# Patient Record
Sex: FEMALE | Race: WHITE | URBAN - METROPOLITAN AREA
[De-identification: names, ages, dates, MRNs, and addresses within clinical notes are randomized per-mention and may not be internally consistent; named-entity substitution may affect disease eponyms.]

---

## 2024-11-06 ENCOUNTER — OFFICE VISIT (OUTPATIENT)
Dept: FAMILY MEDICINE CLINIC | Facility: CLINIC | Age: 29
End: 2024-11-06
Payer: OTHER GOVERNMENT

## 2024-11-06 VITALS
SYSTOLIC BLOOD PRESSURE: 102 MMHG | DIASTOLIC BLOOD PRESSURE: 68 MMHG | RESPIRATION RATE: 16 BRPM | OXYGEN SATURATION: 95 % | HEIGHT: 65 IN | BODY MASS INDEX: 26.82 KG/M2 | WEIGHT: 161 LBS | TEMPERATURE: 97.6 F | HEART RATE: 74 BPM

## 2024-11-06 DIAGNOSIS — Z76.89 ESTABLISHING CARE WITH NEW DOCTOR, ENCOUNTER FOR: Primary | ICD-10-CM

## 2024-11-06 DIAGNOSIS — Z00.00 ANNUAL PHYSICAL EXAM: ICD-10-CM

## 2024-11-06 PROCEDURE — 99385 PREV VISIT NEW AGE 18-39: CPT | Performed by: STUDENT IN AN ORGANIZED HEALTH CARE EDUCATION/TRAINING PROGRAM

## 2024-11-06 PROCEDURE — 90471 IMMUNIZATION ADMIN: CPT | Performed by: STUDENT IN AN ORGANIZED HEALTH CARE EDUCATION/TRAINING PROGRAM

## 2024-11-06 PROCEDURE — 90661 CCIIV3 VAC ABX FR 0.5 ML IM: CPT | Performed by: STUDENT IN AN ORGANIZED HEALTH CARE EDUCATION/TRAINING PROGRAM

## 2024-11-06 RX ORDER — FAMOTIDINE 20 MG/1
20 TABLET, FILM COATED ORAL 2 TIMES DAILY
COMMUNITY

## 2024-11-06 SDOH — ECONOMIC STABILITY: FOOD INSECURITY: WITHIN THE PAST 12 MONTHS, THE FOOD YOU BOUGHT JUST DIDN'T LAST AND YOU DIDN'T HAVE MONEY TO GET MORE.: NEVER TRUE

## 2024-11-06 SDOH — ECONOMIC STABILITY: FOOD INSECURITY: WITHIN THE PAST 12 MONTHS, YOU WORRIED THAT YOUR FOOD WOULD RUN OUT BEFORE YOU GOT MONEY TO BUY MORE.: NEVER TRUE

## 2024-11-06 SDOH — ECONOMIC STABILITY: INCOME INSECURITY: HOW HARD IS IT FOR YOU TO PAY FOR THE VERY BASICS LIKE FOOD, HOUSING, MEDICAL CARE, AND HEATING?: NOT HARD AT ALL

## 2024-11-06 ASSESSMENT — PATIENT HEALTH QUESTIONNAIRE - PHQ9
SUM OF ALL RESPONSES TO PHQ9 QUESTIONS 1 & 2: 0
1. LITTLE INTEREST OR PLEASURE IN DOING THINGS: NOT AT ALL
SUM OF ALL RESPONSES TO PHQ QUESTIONS 1-9: 0
DEPRESSION UNABLE TO ASSESS: FUNCTIONAL CAPACITY MOTIVATION LIMITS ACCURACY
SUM OF ALL RESPONSES TO PHQ QUESTIONS 1-9: 0
2. FEELING DOWN, DEPRESSED OR HOPELESS: NOT AT ALL

## 2024-11-06 ASSESSMENT — ENCOUNTER SYMPTOMS
SINUS PAIN: 0
CHEST TIGHTNESS: 0
SHORTNESS OF BREATH: 0

## 2024-11-06 NOTE — PROGRESS NOTES
Beronica Man is a 29 y.o. female (: 1995) presenting to address:    Chief Complaint   Patient presents with    New Patient     Flu Vaccine       Vitals:    24 1332   BP: 102/68   Pulse: 74   Resp: 16   Temp: 97.6 °F (36.4 °C)   SpO2: 95%       \"Have you been to the ER, urgent care clinic since your last visit?  Hospitalized since your last visit?\"    NO    “Have you seen or consulted any other health care providers outside of [facility] since your last visit?”    Yes, Valeri THACKER        “Have you had a pap smear?”    YES - Where: 2022 in [place] Nurse/CMA to request most recent records if not in the chart    No cervical cancer screening on file         
Immunizations Administered       Name Date Dose Route    Influenza, FLUCELVAX, (age 6 mo+) IM, Trivalent PF, 0.5mL 11/6/2024 0.5 mL Intramuscular    Site: Deltoid- Left    Lot: [id number]    NDC: 11625-336-75           
for agitation and behavioral problems.        Physical Exam  Constitutional:       Appearance: Normal appearance.   HENT:      Head: Normocephalic and atraumatic.   Eyes:      Extraocular Movements: Extraocular movements intact.      Pupils: Pupils are equal, round, and reactive to light.   Cardiovascular:      Rate and Rhythm: Normal rate and regular rhythm.      Heart sounds: Normal heart sounds. No murmur heard.  Pulmonary:      Effort: Pulmonary effort is normal. No respiratory distress.      Breath sounds: No wheezing.   Musculoskeletal:         General: No swelling.   Skin:     General: Skin is warm and dry.   Neurological:      General: No focal deficit present.      Mental Status: She is alert and oriented to person, place, and time.   Psychiatric:         Mood and Affect: Mood normal.         Behavior: Behavior normal.          ASSESSMENT and PLAN    Beronica was seen today for new patient.    Diagnoses and all orders for this visit:    Establishing care with new doctor, encounter for  Comments:  flu vaccine, labs UTD, fu in 1 year for physical    Annual physical exam    Other orders  -     Influenza, FLUCELVAX Trivalent, (age 6 mo+) IM, Preservative Free, 0.5mL                 Tanya Jaimes D.O.      PLEASE NOTE:   This document has been produced using voice recognition software. Unrecognized errors in transcription may be present.

## 2024-11-21 RX ORDER — AMOXICILLIN 875 MG/1
875 TABLET, COATED ORAL EVERY 12 HOURS
COMMUNITY
Start: 2024-11-18 | End: 2024-11-28

## 2024-11-22 ENCOUNTER — OFFICE VISIT (OUTPATIENT)
Dept: FAMILY MEDICINE CLINIC | Facility: CLINIC | Age: 29
End: 2024-11-22

## 2024-11-22 VITALS
TEMPERATURE: 97.8 F | OXYGEN SATURATION: 97 % | HEART RATE: 84 BPM | RESPIRATION RATE: 14 BRPM | BODY MASS INDEX: 27.49 KG/M2 | DIASTOLIC BLOOD PRESSURE: 71 MMHG | SYSTOLIC BLOOD PRESSURE: 109 MMHG | HEIGHT: 64 IN | WEIGHT: 161 LBS

## 2024-11-22 DIAGNOSIS — J06.9 VIRAL URI: Primary | ICD-10-CM

## 2024-11-22 LAB
EXP DATE SOLUTION: NORMAL
EXP DATE SWAB: NORMAL
EXPIRATION DATE: NORMAL
INFLUENZA A ANTIGEN, POC: NEGATIVE
INFLUENZA B ANTIGEN, POC: NEGATIVE
LOT NUMBER POC: NORMAL
LOT NUMBER SOLUTION: NORMAL
LOT NUMBER SWAB: NORMAL
SARS-COV-2 RNA, POC: NEGATIVE
VALID INTERNAL CONTROL, POC: YES

## 2024-11-22 RX ORDER — CETIRIZINE HYDROCHLORIDE 10 MG/1
10 TABLET ORAL DAILY
Qty: 30 TABLET | Refills: 0 | Status: SHIPPED | OUTPATIENT
Start: 2024-11-22

## 2024-11-22 RX ORDER — FLUTICASONE PROPIONATE 50 MCG
1 SPRAY, SUSPENSION (ML) NASAL DAILY PRN
COMMUNITY
End: 2024-11-22 | Stop reason: SDUPTHER

## 2024-11-22 RX ORDER — ACETAMINOPHEN 325 MG/1
650 TABLET ORAL EVERY 6 HOURS PRN
COMMUNITY

## 2024-11-22 RX ORDER — FLUTICASONE PROPIONATE 50 MCG
1 SPRAY, SUSPENSION (ML) NASAL DAILY PRN
Qty: 16 G | Refills: 1 | Status: SHIPPED | OUTPATIENT
Start: 2024-11-22

## 2024-11-22 ASSESSMENT — ENCOUNTER SYMPTOMS
CHEST TIGHTNESS: 0
SINUS PAIN: 1
COUGH: 1
SHORTNESS OF BREATH: 0

## 2024-11-22 NOTE — PROGRESS NOTES
Beronica Man is a 29 y.o. female (: 1995) presenting to address:    Chief Complaint   Patient presents with    Follow-up     Sinus infection DX on Monday with [facility]; was given Amoxicillin but not fully taking symptoms away    Nasal Congestion; Headaches; Ears/Cheek Pain; Wet Cough       Vitals:    24 0845   BP: 109/71   Pulse: 84   Resp: 14   Temp: 97.8 °F (36.6 °C)   SpO2: 97%       \"Have you been to the ER, urgent care clinic since your last visit?  Hospitalized since your last visit?\"    Yes, Urgent Care for Sinus infection     “Have you seen or consulted any other health care providers outside of [facility] since your last visit?”    Yes, OBGYN        “Have you had a pap smear?”    Not Due til     No cervical cancer screening on file

## 2024-11-22 NOTE — PROGRESS NOTES
[facility]    HISTORY OF PRESENT ILLNESS  Beronica Man  is a 29 y.o. y.o. female here for acute visit    Sinusitis 1.5 weeks  -went to urgent care at start of symptoms, prescribed amoxicillin  -abx have not helped, feeling worse, but still on amoxicillin  -endorses pain in ears and radiates to jaws  -productive cough  -taking tylenol, mucinex, afrin, some relief with mucinex  -toddler son is sick too  -has been off of work because not feeling well  -pregnant at 18 weeks      Mr#: [id number]      History reviewed. No pertinent past medical history.    History reviewed. No pertinent surgical history.    History reviewed. No pertinent family history.    No Known Allergies    Social History     Tobacco Use   Smoking Status Never    Passive exposure: Never   Smokeless Tobacco Never       Social History     Substance and Sexual Activity   Alcohol Use Not Currently       Immunization History   Administered Date(s) Administered    Influenza, FLUCELVAX, (age 6 mo+) IM, Trivalent PF, 0.5mL 11/06/2024       There is no problem list on file for this patient.        Current Outpatient Medications:     dextromethorphan-guaiFENesin (MUCINEX DM)  MG per extended release tablet, Take 1 tablet by mouth every 12 hours as needed, Disp: , Rfl:     acetaminophen (TYLENOL) 325 MG tablet, Take 2 tablets by mouth every 6 hours as needed for Pain, Disp: , Rfl:     cetirizine (ZYRTEC) 10 MG tablet, Take 1 tablet by mouth daily, Disp: 30 tablet, Rfl: 0    fluticasone (FLONASE) 50 MCG/ACT nasal spray, 1 spray by Each Nostril route daily as needed for Rhinitis, Disp: 16 g, Rfl: 1    amoxicillin (AMOXIL) 875 MG tablet, Take 1 tablet by mouth in the morning and 1 tablet in the evening., Disp: , Rfl:     famotidine (PEPCID) 20 MG tablet, Take 1 tablet by mouth 2 times daily, Disp: , Rfl:       Review of Systems   Constitutional:  Negative for activity change and fever.   HENT:  Positive for congestion, ear pain and

## 2025-04-30 ENCOUNTER — RESULTS FOLLOW-UP (OUTPATIENT)
Dept: FAMILY MEDICINE CLINIC | Facility: CLINIC | Age: 30
End: 2025-04-30

## 2025-04-30 ENCOUNTER — HOSPITAL ENCOUNTER (OUTPATIENT)
Facility: HOSPITAL | Age: 30
Setting detail: SPECIMEN
Discharge: HOME | End: 2025-05-03
Payer: OTHER GOVERNMENT

## 2025-04-30 ENCOUNTER — OFFICE VISIT (OUTPATIENT)
Dept: FAMILY MEDICINE CLINIC | Facility: CLINIC | Age: 30
End: 2025-04-30
Payer: OTHER GOVERNMENT

## 2025-04-30 VITALS
HEART RATE: 71 BPM | DIASTOLIC BLOOD PRESSURE: 78 MMHG | HEIGHT: 64 IN | RESPIRATION RATE: 15 BRPM | WEIGHT: 166.8 LBS | OXYGEN SATURATION: 96 % | TEMPERATURE: 97.9 F | BODY MASS INDEX: 28.48 KG/M2 | SYSTOLIC BLOOD PRESSURE: 112 MMHG

## 2025-04-30 DIAGNOSIS — R30.0 DYSURIA: ICD-10-CM

## 2025-04-30 DIAGNOSIS — K42.9 UMBILICAL HERNIA WITHOUT OBSTRUCTION AND WITHOUT GANGRENE: Primary | ICD-10-CM

## 2025-04-30 LAB
BILIRUBIN, URINE, POC: NEGATIVE
BLOOD URINE, POC: ABNORMAL
GLUCOSE URINE, POC: NEGATIVE
KETONES, URINE, POC: NEGATIVE
LEUKOCYTE ESTERASE, URINE, POC: ABNORMAL
NITRITE, URINE, POC: NEGATIVE
PH, URINE, POC: 7 (ref 4.6–8)
PROTEIN,URINE, POC: NEGATIVE
SPECIFIC GRAVITY, URINE, POC: 1.01 (ref 1–1.03)
URINALYSIS CLARITY, POC: CLEAR
URINALYSIS COLOR, POC: YELLOW
UROBILINOGEN, POC: ABNORMAL

## 2025-04-30 PROCEDURE — 87088 URINE BACTERIA CULTURE: CPT

## 2025-04-30 PROCEDURE — 81003 URINALYSIS AUTO W/O SCOPE: CPT | Performed by: STUDENT IN AN ORGANIZED HEALTH CARE EDUCATION/TRAINING PROGRAM

## 2025-04-30 PROCEDURE — 99214 OFFICE O/P EST MOD 30 MIN: CPT | Performed by: STUDENT IN AN ORGANIZED HEALTH CARE EDUCATION/TRAINING PROGRAM

## 2025-04-30 PROCEDURE — 87086 URINE CULTURE/COLONY COUNT: CPT

## 2025-04-30 RX ORDER — SULFAMETHOXAZOLE AND TRIMETHOPRIM 800; 160 MG/1; MG/1
1 TABLET ORAL 2 TIMES DAILY
Qty: 6 TABLET | Refills: 0 | Status: SHIPPED | OUTPATIENT
Start: 2025-04-30 | End: 2025-05-03

## 2025-04-30 RX ORDER — FERROUS SULFATE 325(65) MG
325 TABLET ORAL DAILY
COMMUNITY
Start: 2025-04-18

## 2025-04-30 SDOH — ECONOMIC STABILITY: FOOD INSECURITY: WITHIN THE PAST 12 MONTHS, YOU WORRIED THAT YOUR FOOD WOULD RUN OUT BEFORE YOU GOT MONEY TO BUY MORE.: NEVER TRUE

## 2025-04-30 SDOH — ECONOMIC STABILITY: FOOD INSECURITY: WITHIN THE PAST 12 MONTHS, THE FOOD YOU BOUGHT JUST DIDN'T LAST AND YOU DIDN'T HAVE MONEY TO GET MORE.: NEVER TRUE

## 2025-04-30 ASSESSMENT — PATIENT HEALTH QUESTIONNAIRE - PHQ9
1. LITTLE INTEREST OR PLEASURE IN DOING THINGS: NOT AT ALL
SUM OF ALL RESPONSES TO PHQ QUESTIONS 1-9: 0
2. FEELING DOWN, DEPRESSED OR HOPELESS: NOT AT ALL
SUM OF ALL RESPONSES TO PHQ QUESTIONS 1-9: 0

## 2025-04-30 ASSESSMENT — ENCOUNTER SYMPTOMS
CHEST TIGHTNESS: 0
SHORTNESS OF BREATH: 0
SINUS PAIN: 0

## 2025-04-30 NOTE — PROGRESS NOTES
Beronica Man is a 29 y.o. female (: 1995) presenting to address:    Chief Complaint   Patient presents with    umbilical hernia       Vitals:    25 1150   BP: 112/78   Pulse: 71   Resp: 15   Temp: 97.9 °F (36.6 °C)   SpO2: 96%       \"Have you been to the ER, urgent care clinic since your last visit?  Hospitalized since your last visit?\"    NO    “Have you seen or consulted any other health care providers outside of [facility] since your last visit?”    NO        “Have you had a pap smear?”    NO    No cervical cancer screening on file

## 2025-04-30 NOTE — PROGRESS NOTES
[facility]    HISTORY OF PRESENT ILLNESS  Beronica Man  is a 29 y.o. y.o. female here for FU.    Umbilical hernia  -started during pregnancy  -became painful during pregnancy  after pregnancy    Dysuria  -started 2 days ago  -would like to check urine  -breast feeding    Mr#: [id number]      History reviewed. No pertinent past medical history.    History reviewed. No pertinent surgical history.    History reviewed. No pertinent family history.    No Known Allergies    Social History     Tobacco Use   Smoking Status Never    Passive exposure: Never   Smokeless Tobacco Never       Social History     Substance and Sexual Activity   Alcohol Use Not Currently       Immunization History   Administered Date(s) Administered    COVID-19, MODERNA BLUE border, Primary or Immunocompromised, (age 12y+), IM, 100 mcg/0.5mL 01/27/2021, 02/26/2021, 12/08/2021    Influenza, FLUCELVAX, (age 6 mo+) IM, Trivalent PF, 0.5mL 11/06/2024       There is no problem list on file for this patient.        Current Outpatient Medications:     ferrous sulfate (IRON 325) 325 (65 Fe) MG tablet, Take 1 tablet by mouth daily, Disp: , Rfl:     Ferrous Sulfate (IRON PO), Take 1 tablet by mouth daily, Disp: , Rfl:     sulfamethoxazole-trimethoprim (BACTRIM DS;SEPTRA DS) 800-160 MG per tablet, Take 1 tablet by mouth 2 times daily for 3 days, Disp: 6 tablet, Rfl: 0    acetaminophen (TYLENOL) 325 MG tablet, Take 2 tablets by mouth every 6 hours as needed for Pain, Disp: , Rfl:     cetirizine (ZYRTEC) 10 MG tablet, Take 1 tablet by mouth daily (Patient not taking: Reported on 4/30/2025), Disp: 30 tablet, Rfl: 0    fluticasone (FLONASE) 50 MCG/ACT nasal spray, 1 spray by Each Nostril route daily as needed for Rhinitis (Patient not taking: Reported on 4/30/2025), Disp: 16 g, Rfl: 1      Review of Systems   Constitutional:  Negative for activity change.   HENT:  Negative for congestion and sinus pain.    Respiratory:  Negative for chest

## 2025-05-02 LAB
BACTERIA SPEC CULT: ABNORMAL
CC UR VC: ABNORMAL
SERVICE CMNT-IMP: ABNORMAL

## 2025-05-05 ENCOUNTER — RESULTS FOLLOW-UP (OUTPATIENT)
Dept: FAMILY MEDICINE CLINIC | Facility: CLINIC | Age: 30
End: 2025-05-05

## 2025-05-05 RX ORDER — PENICILLIN V POTASSIUM 250 MG/1
500 TABLET, FILM COATED ORAL 2 TIMES DAILY
Qty: 40 TABLET | Refills: 0 | Status: SHIPPED | OUTPATIENT
Start: 2025-05-05 | End: 2025-05-15

## 2025-05-05 NOTE — TELEPHONE ENCOUNTER
Patient called about medication sent to the pharmacy for a new antibiotic prescription. Informed patient of message from Dr Brush. Patient wanted to know if previous medication was safe for breast feeding. Patient informed that PCP confirmed medication was safe for breast feeding. Patient verbally understood.

## 2025-06-05 ENCOUNTER — OFFICE VISIT (OUTPATIENT)
Dept: FAMILY MEDICINE CLINIC | Facility: CLINIC | Age: 30
End: 2025-06-05
Payer: OTHER GOVERNMENT

## 2025-06-05 ENCOUNTER — RESULTS FOLLOW-UP (OUTPATIENT)
Dept: FAMILY MEDICINE CLINIC | Facility: CLINIC | Age: 30
End: 2025-06-05

## 2025-06-05 VITALS
HEART RATE: 86 BPM | DIASTOLIC BLOOD PRESSURE: 78 MMHG | HEIGHT: 64 IN | RESPIRATION RATE: 20 BRPM | SYSTOLIC BLOOD PRESSURE: 113 MMHG | TEMPERATURE: 96.9 F | WEIGHT: 172 LBS | OXYGEN SATURATION: 96 % | BODY MASS INDEX: 29.37 KG/M2

## 2025-06-05 DIAGNOSIS — J01.11 ACUTE RECURRENT FRONTAL SINUSITIS: Primary | ICD-10-CM

## 2025-06-05 LAB
GROUP A STREP ANTIGEN, POC: NEGATIVE
VALID INTERNAL CONTROL, POC: NORMAL

## 2025-06-05 PROCEDURE — 87430 STREP A AG IA: CPT | Performed by: STUDENT IN AN ORGANIZED HEALTH CARE EDUCATION/TRAINING PROGRAM

## 2025-06-05 PROCEDURE — 99214 OFFICE O/P EST MOD 30 MIN: CPT | Performed by: STUDENT IN AN ORGANIZED HEALTH CARE EDUCATION/TRAINING PROGRAM

## 2025-06-05 RX ORDER — DOXYCYCLINE HYCLATE 100 MG
100 TABLET ORAL 2 TIMES DAILY
Qty: 10 TABLET | Refills: 0 | Status: SHIPPED | OUTPATIENT
Start: 2025-06-05 | End: 2025-06-10

## 2025-06-05 ASSESSMENT — ENCOUNTER SYMPTOMS
CHEST TIGHTNESS: 0
SHORTNESS OF BREATH: 0
SINUS PAIN: 0

## 2025-06-05 ASSESSMENT — PATIENT HEALTH QUESTIONNAIRE - PHQ9
1. LITTLE INTEREST OR PLEASURE IN DOING THINGS: NOT AT ALL
SUM OF ALL RESPONSES TO PHQ QUESTIONS 1-9: 0
SUM OF ALL RESPONSES TO PHQ QUESTIONS 1-9: 0
2. FEELING DOWN, DEPRESSED OR HOPELESS: NOT AT ALL
SUM OF ALL RESPONSES TO PHQ QUESTIONS 1-9: 0
SUM OF ALL RESPONSES TO PHQ QUESTIONS 1-9: 0

## 2025-06-05 NOTE — PROGRESS NOTES
[facility]    HISTORY OF PRESENT ILLNESS  Beronica Man  is a 30 y.o. y.o. female here for acute visit    Sore throat x 2 weeks  -not improving  -no fevers, or body aches  -does have HA  -using flonase  -dayquil  -family has been sick, but they have all improved and she has not      Mr#: [id number]      History reviewed. No pertinent past medical history.    History reviewed. No pertinent surgical history.    History reviewed. No pertinent family history.    No Known Allergies    Social History     Tobacco Use   Smoking Status Never    Passive exposure: Never   Smokeless Tobacco Never       Social History     Substance and Sexual Activity   Alcohol Use Not Currently       Immunization History   Administered Date(s) Administered    COVID-19, MODERNA BLUE border, Primary or Immunocompromised, (age 12y+), IM, 100 mcg/0.5mL 01/27/2021, 02/26/2021, 12/08/2021    Influenza, FLUCELVAX, (age 6 mo+) IM, Trivalent PF, 0.5mL 11/06/2024       There is no problem list on file for this patient.        Current Outpatient Medications:     Multiple Vitamins-Minerals (MULTIVITAMIN ADULTS PO), Take by mouth, Disp: , Rfl:     doxycycline hyclate (VIBRA-TABS) 100 MG tablet, Take 1 tablet by mouth 2 times daily for 5 days, Disp: 10 tablet, Rfl: 0      Review of Systems   Constitutional:  Negative for activity change.   HENT:  Negative for congestion and sinus pain.    Respiratory:  Negative for chest tightness and shortness of breath.    Cardiovascular:  Negative for chest pain.   Neurological:  Negative for dizziness and seizures.   Psychiatric/Behavioral:  Negative for agitation and behavioral problems.        Physical Exam  Constitutional:       Appearance: Normal appearance.   HENT:      Head: Normocephalic and atraumatic.      Nose: Congestion present.   Eyes:      Extraocular Movements: Extraocular movements intact.      Pupils: Pupils are equal, round, and reactive to light.   Cardiovascular:      Rate and